# Patient Record
Sex: MALE | Race: WHITE | URBAN - METROPOLITAN AREA
[De-identification: names, ages, dates, MRNs, and addresses within clinical notes are randomized per-mention and may not be internally consistent; named-entity substitution may affect disease eponyms.]

---
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
## 2022-08-24 NOTE — MR AVS SNAPSHOT
After Visit Summary   11/21/2017    Ramesh Villaseñor    MRN: [id number]           Patient Information     Date Of Birth          2000        Visit Information        Provider Department      11/21/2017 8:00 AM Devora Nava SLP [facility]        Today's Diagnoses     Dysphonia    -  1    Pre-nodular edema of the vocal folds           Follow-ups after your visit        Your next 10 appointments already scheduled     Dec 04, 2017  8:00 AM CST   (Arrive by 7:45 AM)   RETURN SLP VOICE with [facility] ([facility])    [address] [address]   [phone]            Dec 19, 2017  8:00 AM CST   (Arrive by 7:45 AM)   RETURN SLP VOICE with [facility] ([facility])    [address] [place] [address]   [phone]            Jan 04, 2018  8:00 AM CST   (Arrive by 7:45 AM)   RETURN SLP VOICE with [facility] ([facility])    [address] [place] [address]   [phone]            Jan 18, 2018  8:00 AM CST   (Arrive by 7:45 AM)   RETURN SLP VOICE with [facility] ([facility])    [address] [address]   [phone]            Feb 01, 2018  8:00 AM CST   (Arrive by 7:45 AM)   RETURN SLP VOICE with [facility] ([facility])    [address] [place] [address]   [phone]            Feb 15, 2018  8:00 AM CST   (Arrive by 7:45 AM)   RETURN SLP VOICE with Lester Giordano, SLP   [facility] ([facility])    [address] [place] [address]   [phone]            Mar 01, 2018  8:00 AM CST   (Arrive by 7:45 AM)   RETURN SLP VOICE with [facility] ([facility])    [address] [facility]  4th Floor  [facility] [address] [phone]              Who to contact     Please call your clinic at [phone] to:    Ask questions about your health    Make or cancel appointments    Discuss your medicines    Learn about your test results    Speak to your doctor   If you have compliments or concerns about an experience at your clinic, or if you wish to file a complaint, please contact [facility] Patient Relations at [phone] or email us at [email or web address]         Additional Information About Your Visit        MyChart Information     [employer] is an electronic gateway that provides easy, online access to your medical records. With [employer], you can request a clinic appointment, read your test results, renew a prescription or communicate with your care team.     To sign up for [email or web address], please contact your [facility] or call [phone] for assistance.           Care EveryWhere ID     This is your Care EveryWhere ID. This could be used by other organizations to access your [place] [facility] medical records  Opted out of Care Everywhere exchange         Blood Pressure from Last 3 Encounters:   No data found for BP    Weight from Last 3 Encounters:   04/17/17 75.3 kg (166 lb) (85 %)*   03/27/17 72.6 kg (160 lb) (80 %)*     * Growth percentiles are based on CDC 2-20 Years data.              We Performed the Following     SPEECH/HEARING THERAPY, INDIVIDUAL        Primary Care Provider    None Specified       No primary provider on file.        Equal Access to Services     CIRILO LINDO : Dora Rodgers, laxmi richard, nathalia pearson, martha wei. So [facility] [phone].    ATENCIÓN: Si habla español, tiene a polk disposición servicios gratuitos de asistencia lingüística. Llame al [phone].    We comply with  applicable federal civil rights laws and [place] laws. We do not discriminate on the basis of race, color, national origin, age, disability, sex, sexual orientation, or gender identity.            Thank you!     Thank you for choosing [facility]  for your care. Our goal is always to provide you with excellent care. Hearing back from our patients is one way we can continue to improve our services. Please take a few minutes to complete the written survey that you may receive in the mail after your visit with us. Thank you!             Your Updated Medication List - Protect others around you: Learn how to safely use, store and throw away your medicines at www.disposemymeds.org.          This list is accurate as of: 11/21/17 11:59 PM.  Always use your most recent med list.                   Brand Name Dispense Instructions for use Diagnosis    predniSONE 20 MG tablet    DELTASONE     One tab by mouth once           Tazorac Counseling:  Patient advised that medication is irritating and drying.  Patient may need to apply sparingly and wash off after an hour before eventually leaving it on overnight.  The patient verbalized understanding of the proper use and possible adverse effects of tazorac.  All of the patient's questions and concerns were addressed.